# Patient Record
Sex: FEMALE | Race: BLACK OR AFRICAN AMERICAN | NOT HISPANIC OR LATINO | Employment: UNEMPLOYED | URBAN - METROPOLITAN AREA
[De-identification: names, ages, dates, MRNs, and addresses within clinical notes are randomized per-mention and may not be internally consistent; named-entity substitution may affect disease eponyms.]

---

## 2021-03-24 ENCOUNTER — HISTORICAL (OUTPATIENT)
Dept: RADIOLOGY | Facility: HOSPITAL | Age: 41
End: 2021-03-24

## 2021-03-24 LAB
ABS NEUT (OLG): 2.1 X10(3)/MCL (ref 1.5–6.9)
ALBUMIN SERPL-MCNC: 3.9 GM/DL (ref 3.5–5)
ALBUMIN/GLOB SERPL: 1 RATIO (ref 1.1–2)
ALP SERPL-CCNC: 72 UNIT/L (ref 40–150)
ALT SERPL-CCNC: 17 UNIT/L (ref 0–55)
AST SERPL-CCNC: 19 UNIT/L (ref 5–34)
BASOPHILS # BLD AUTO: 0 X10(3)/MCL (ref 0–0.1)
BASOPHILS NFR BLD AUTO: 0 % (ref 0–1)
BILIRUB SERPL-MCNC: 0.4 MG/DL
BILIRUBIN DIRECT+TOT PNL SERPL-MCNC: 0.1 MG/DL (ref 0–0.5)
BILIRUBIN DIRECT+TOT PNL SERPL-MCNC: 0.3 MG/DL (ref 0–0.8)
BUN SERPL-MCNC: 11 MG/DL (ref 7–18.7)
CALCIUM SERPL-MCNC: 9.9 MG/DL (ref 8.4–10.2)
CHLORIDE SERPL-SCNC: 104 MMOL/L (ref 98–107)
CHOLEST SERPL-MCNC: 235 MG/DL
CHOLEST/HDLC SERPL: 5 {RATIO} (ref 0–5)
CO2 SERPL-SCNC: 29 MMOL/L (ref 22–29)
CREAT SERPL-MCNC: 0.95 MG/DL (ref 0.55–1.02)
DEPRECATED CALCIDIOL+CALCIFEROL SERPL-MC: 8.8 NG/ML (ref 30–80)
EOSINOPHIL # BLD AUTO: 0.1 X10(3)/MCL (ref 0–0.6)
EOSINOPHIL NFR BLD AUTO: 2 % (ref 0–5)
ERYTHROCYTE [DISTWIDTH] IN BLOOD BY AUTOMATED COUNT: 13.1 % (ref 11.5–17)
EST. AVERAGE GLUCOSE BLD GHB EST-MCNC: 105.4 MG/DL
GLOBULIN SER-MCNC: 4 GM/DL (ref 2.4–3.5)
GLUCOSE SERPL-MCNC: 89 MG/DL (ref 74–100)
HBA1C MFR BLD: 5.3 %
HCT VFR BLD AUTO: 42.5 % (ref 36–48)
HDLC SERPL-MCNC: 43 MG/DL (ref 35–60)
HGB BLD-MCNC: 13.4 GM/DL (ref 12–16)
IMM GRANULOCYTES # BLD AUTO: 0.01 10*3/UL (ref 0–0.02)
IMM GRANULOCYTES NFR BLD AUTO: 0.2 % (ref 0–0.43)
LDLC SERPL CALC-MCNC: 165 MG/DL (ref 50–140)
LYMPHOCYTES # BLD AUTO: 2 X10(3)/MCL (ref 0.5–4.1)
LYMPHOCYTES NFR BLD AUTO: 44 % (ref 15–40)
MCH RBC QN AUTO: 28 PG (ref 27–34)
MCHC RBC AUTO-ENTMCNC: 32 GM/DL (ref 31–36)
MCV RBC AUTO: 90 FL (ref 80–99)
MONOCYTES # BLD AUTO: 0.4 X10(3)/MCL (ref 0–1.1)
MONOCYTES NFR BLD AUTO: 8 % (ref 4–12)
NEUTROPHILS # BLD AUTO: 2.1 X10(3)/MCL (ref 1.5–6.9)
NEUTROPHILS NFR BLD AUTO: 45 % (ref 43–75)
PLATELET # BLD AUTO: 281 X10(3)/MCL (ref 140–400)
PMV BLD AUTO: 10.3 FL (ref 6.8–10)
POTASSIUM SERPL-SCNC: 4.2 MMOL/L (ref 3.5–5.1)
PROT SERPL-MCNC: 7.9 GM/DL (ref 6.4–8.3)
RBC # BLD AUTO: 4.75 X10(6)/MCL (ref 4.2–5.4)
SODIUM SERPL-SCNC: 141 MMOL/L (ref 136–145)
TRIGL SERPL-MCNC: 137 MG/DL (ref 37–140)
TSH SERPL-ACNC: 0.73 UIU/ML (ref 0.35–4.94)
VLDLC SERPL CALC-MCNC: 27 MG/DL
WBC # SPEC AUTO: 4.6 X10(3)/MCL (ref 4.5–11.5)

## 2022-02-11 ENCOUNTER — HISTORICAL (OUTPATIENT)
Dept: ADMINISTRATIVE | Facility: HOSPITAL | Age: 42
End: 2022-02-11

## 2022-06-27 ENCOUNTER — CLINICAL SUPPORT (OUTPATIENT)
Dept: RESPIRATORY THERAPY | Facility: HOSPITAL | Age: 42
End: 2022-06-27
Attending: NURSE PRACTITIONER
Payer: MEDICAID

## 2022-06-27 DIAGNOSIS — F15.90 OTHER STIMULANT USE, UNSPECIFIED, UNCOMPLICATED: Primary | ICD-10-CM

## 2022-06-27 DIAGNOSIS — F15.90 OTHER STIMULANT USE, UNSPECIFIED, UNCOMPLICATED: ICD-10-CM

## 2022-06-27 PROCEDURE — 93005 ELECTROCARDIOGRAM TRACING: CPT

## 2022-07-07 ENCOUNTER — HOSPITAL ENCOUNTER (EMERGENCY)
Facility: HOSPITAL | Age: 42
Discharge: HOME | End: 2022-07-07
Attending: INTERNAL MEDICINE
Payer: MEDICAID

## 2022-07-07 VITALS
SYSTOLIC BLOOD PRESSURE: 137 MMHG | DIASTOLIC BLOOD PRESSURE: 77 MMHG | HEIGHT: 66 IN | WEIGHT: 250 LBS | BODY MASS INDEX: 40.18 KG/M2 | TEMPERATURE: 98 F | RESPIRATION RATE: 20 BRPM | HEART RATE: 79 BPM | OXYGEN SATURATION: 99 %

## 2022-07-07 DIAGNOSIS — M10.9 ACUTE GOUT OF LEFT FOOT, UNSPECIFIED CAUSE: Primary | ICD-10-CM

## 2022-07-07 PROCEDURE — 96372 THER/PROPH/DIAG INJ SC/IM: CPT | Performed by: NURSE PRACTITIONER

## 2022-07-07 PROCEDURE — 63600175 PHARM REV CODE 636 W HCPCS: Performed by: NURSE PRACTITIONER

## 2022-07-07 PROCEDURE — 99284 EMERGENCY DEPT VISIT MOD MDM: CPT

## 2022-07-07 RX ORDER — IPRATROPIUM BROMIDE AND ALBUTEROL SULFATE 2.5; .5 MG/3ML; MG/3ML
3 SOLUTION RESPIRATORY (INHALATION)
COMMUNITY
Start: 2022-02-11

## 2022-07-07 RX ORDER — INDOMETHACIN 50 MG/1
50 CAPSULE ORAL
Qty: 15 CAPSULE | Refills: 0 | OUTPATIENT
Start: 2022-07-07 | End: 2023-08-16

## 2022-07-07 RX ORDER — IBUPROFEN 800 MG/1
TABLET ORAL
COMMUNITY
Start: 2022-06-19

## 2022-07-07 RX ORDER — BUSPIRONE HYDROCHLORIDE 7.5 MG/1
TABLET ORAL
COMMUNITY
Start: 2022-05-11

## 2022-07-07 RX ORDER — KETOROLAC TROMETHAMINE 30 MG/ML
30 INJECTION, SOLUTION INTRAMUSCULAR; INTRAVENOUS ONCE
Status: COMPLETED | OUTPATIENT
Start: 2022-07-07 | End: 2022-07-07

## 2022-07-07 RX ORDER — TRAZODONE HYDROCHLORIDE 50 MG/1
TABLET ORAL
COMMUNITY
Start: 2022-06-27

## 2022-07-07 RX ORDER — SERTRALINE HYDROCHLORIDE 50 MG/1
TABLET, FILM COATED ORAL
COMMUNITY
Start: 2022-05-11

## 2022-07-07 RX ORDER — DEXAMETHASONE SODIUM PHOSPHATE 4 MG/ML
4 INJECTION, SOLUTION INTRA-ARTICULAR; INTRALESIONAL; INTRAMUSCULAR; INTRAVENOUS; SOFT TISSUE
Status: COMPLETED | OUTPATIENT
Start: 2022-07-07 | End: 2022-07-07

## 2022-07-07 RX ORDER — DEXTROAMPHETAMINE SACCHARATE, AMPHETAMINE ASPARTATE MONOHYDRATE, DEXTROAMPHETAMINE SULFATE AND AMPHETAMINE SULFATE 5; 5; 5; 5 MG/1; MG/1; MG/1; MG/1
20 CAPSULE, EXTENDED RELEASE ORAL
COMMUNITY
Start: 2022-02-11

## 2022-07-07 RX ORDER — CETIRIZINE HYDROCHLORIDE 10 MG/1
10 TABLET ORAL DAILY PRN
COMMUNITY
Start: 2022-03-14

## 2022-07-07 RX ORDER — CLONIDINE HYDROCHLORIDE 0.1 MG/1
0.1 TABLET ORAL
COMMUNITY
Start: 2022-02-11

## 2022-07-07 RX ADMIN — KETOROLAC TROMETHAMINE 30 MG: 30 INJECTION, SOLUTION INTRAMUSCULAR; INTRAVENOUS at 09:07

## 2022-07-07 RX ADMIN — DEXAMETHASONE SODIUM PHOSPHATE 4 MG: 4 INJECTION, SOLUTION INTRA-ARTICULAR; INTRALESIONAL; INTRAMUSCULAR; INTRAVENOUS; SOFT TISSUE at 09:07

## 2022-07-08 NOTE — ED PROVIDER NOTES
"     Source of History:  Patient    Chief complaint:  Foot Injury (Left foot pain across top. Started yesterday. 10/10 pain)      HPI:  Susie Mcdermott is a 42 y.o. female presenting with left foot pain x1 day.  She states this started after wearing some tight fitting shoes and got to the point where was unable to bear weight without pain.  She has been ambulating but a limping gait.  She reports pain was 10/10 but she did take some ibuprofen which relieved some of it prior to arrival.  Obviously weight-bearing makes the pain worse and ibuprofen did give some relief prior to arrival.    This is the extent to the patients complaints today here in the emergency department.    ROS: As per HPI and below:  General: No fever.  No chills.  Eyes: No visual changes.  ENT: No sore throat. No ear pain  Head: No headache.    Chest: No shortness of breath. No cough.  Cardiovascular: No chest pain.  Abdomen: No abdominal pain.  No nausea or vomiting.  Genito-Urinary: No abnormal urination.  Neurologic: No focal weakness.  No numbness.  MSK:  Left foot pain.  Integument: No rashes or lesions.  Psych: No confusion      Review of patient's allergies indicates:  No Known Allergies    PMH:  As per HPI and below:  No past medical history on file.  Past Surgical History:   Procedure Laterality Date    INTERNAL FIXATION OF HIP USING THORNTON PIN         Social History     Tobacco Use    Smoking status: Never Smoker    Smokeless tobacco: Never Used   Substance Use Topics    Alcohol use: Yes     Comment: occasional       Physical Exam:    BP (!) 145/92   Pulse 63   Temp 98.7 °F (37.1 °C) (Oral)   Resp 16   Ht 5' 6" (1.676 m)   Wt 113.4 kg (250 lb)   LMP 06/23/2022   SpO2 97%   BMI 40.35 kg/m²   Nursing note and vital signs reviewed.  Appearance: Afebrile. Not toxic appearing. No acute distress.  Head: Atraumatic  Eyes: No conjunctival injection. No scleral icterus  ENT: Normal phonation  Chest/ Respiratory: No respiratory " distress. No accessory muscle use.  Cardiovascular: Regular rate   Abdomen:  Not distended.    Musculoskeletal:  Limited range of motion due to pain with some erythema to the left dorsal foot and some mild swelling.  Very painful to touch.  Denies history of gout..  No deformities.  Neck supple.  No meningismus.  Skin: No rashes seen.  Good turgor.  No ecchymoses.  Neurologic: GCS 15. Ambulates with a steady gait.   Mental Status:  Alert and oriented x 3.  Appropriate, conversant    Labs that have been ordered have been independently reviewed and interpreted by myself.    I decided to obtain the patient's medical records.  Summary of Medical Records:  Nursing documentation    Initial Impression/ Differential Dx:  Gout, tendinitis, foot strain.    MDM:    42 y.o. female with nontraumatic left foot pain presents to the emergency department for evaluation.  She denies history of gout but does eat red meat and denies frequent alcohol use.  With the severe tenderness to the left foot as well as the erythema and swelling I feel like this is most likely gout however tendinitis is a possibility due to her wearing some tight fitting shoes and could possibly have strained some of the ligaments in her foot.  I discussed the treatment for both the same however that she would definitely need to follow-up with her primary care provider to have a uric acid level drawn and possibly be treated for high uric acid levels.  She verbalized understanding of this.  We discussed the eye medications tonight and anti-inflammatories and steroids to use for pain at home.                   Diagnostic Impression:    1. Acute gout of left foot, unspecified cause         ED Disposition Condition    Discharge Stable          ED Prescriptions     Medication Sig Dispense Start Date End Date Auth. Provider    indomethacin (INDOCIN) 50 MG capsule Take 1 capsule (50 mg total) by mouth 3 (three) times daily with meals. 15 capsule 7/7/2022  Jose LOONEY  JEM Michelle        Follow-up Information     Follow up With Specialties Details Why Contact Info    Kelley Dejesus NP [facility]   [address] [facility] [address]  [phone]             JEM Schumacher  07/07/22 0993

## 2023-03-15 ENCOUNTER — HOSPITAL ENCOUNTER (EMERGENCY)
Facility: HOSPITAL | Age: 43
Discharge: HOME | End: 2023-03-15
Attending: INTERNAL MEDICINE
Payer: MEDICAID

## 2023-03-15 VITALS
BODY MASS INDEX: 40.97 KG/M2 | WEIGHT: 240 LBS | RESPIRATION RATE: 18 BRPM | OXYGEN SATURATION: 98 % | HEIGHT: 64 IN | DIASTOLIC BLOOD PRESSURE: 86 MMHG | SYSTOLIC BLOOD PRESSURE: 153 MMHG | HEART RATE: 89 BPM | TEMPERATURE: 98 F

## 2023-03-15 DIAGNOSIS — S93.601A SPRAIN OF RIGHT FOOT, INITIAL ENCOUNTER: Primary | ICD-10-CM
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
## 2023-03-15 PROCEDURE — 99283 EMERGENCY DEPT VISIT LOW MDM: CPT

## 2023-03-15 RX ORDER — MELOXICAM 7.5 MG/1
7.5 TABLET ORAL DAILY
Qty: 30 TABLET | Refills: 0 | Status: SHIPPED | OUTPATIENT
Start: 2023-03-15 | End: 2023-04-14

## 2023-03-15 NOTE — ED PROVIDER NOTES
Encounter Date: 3/15/2023       History     Chief Complaint   Patient presents with    Foot Injury     R foot pain   non traumatic for severl weeks    treated for gout but not working     42-year-old female presents to the emergency department with complaints of right foot pain.  Patient was here with her mother who is being evaluated for possible UTI so she decided to get the foot checked out again.  Patient has been seen by urgent care but they did not have x-rays that they treated it empirically as gout with indomethacin and some IM injections.  She states he got much better after the IM injections however pain has returned and she states is not as severe now.  She denies any injury or trauma to this foot.  Denies history of gout but thought that it looked like gout at the time she went into the emergency room.  Denies any other associated symptoms or soon relieving factors otherwise.    Review of patient's allergies indicates:  No Known Allergies  No past medical history on file.  Past Surgical History:   Procedure Laterality Date    INTERNAL FIXATION OF HIP USING THORNTON PIN       No family history on file.  Social History     Tobacco Use    Smoking status: Never    Smokeless tobacco: Never   Substance Use Topics    Alcohol use: Yes     Comment: occasional     Review of Systems   Constitutional:  Negative for activity change, appetite change and fever.   HENT:  Negative for congestion, dental problem and sore throat.    Eyes:  Negative for discharge and itching.   Respiratory:  Negative for apnea, chest tightness and shortness of breath.    Cardiovascular:  Negative for chest pain.   Gastrointestinal:  Negative for abdominal distention, abdominal pain and nausea.   Endocrine: Negative for cold intolerance and heat intolerance.   Genitourinary:  Negative for dysuria, vaginal discharge and vaginal pain.   Musculoskeletal:  Positive for myalgias. Negative for back pain and joint swelling.   Skin:  Negative for  rash.   Neurological:  Negative for dizziness, facial asymmetry and weakness.   Hematological:  Does not bruise/bleed easily.   Psychiatric/Behavioral:  Negative for agitation and behavioral problems.    All other systems reviewed and are negative.    Physical Exam     Initial Vitals [03/15/23 1419]   BP Pulse Resp Temp SpO2   (!) 160/89 80 20 98 °F (36.7 °C) 98 %      MAP       --         Physical Exam    Nursing note and vitals reviewed.  Constitutional: Vital signs are normal. She appears well-developed and well-nourished.  Non-toxic appearance. She does not have a sickly appearance.   HENT:   Head: Normocephalic and atraumatic.   Right Ear: External ear normal.   Left Ear: External ear normal.   Eyes: Conjunctivae, EOM and lids are normal. Pupils are equal, round, and reactive to light. Lids are everted and swept, no foreign bodies found.   Neck: Trachea normal and phonation normal. Neck supple. No thyroid mass and no thyromegaly present.   Normal range of motion.   Full passive range of motion without pain.     Cardiovascular:  Normal rate, regular rhythm, S1 normal, S2 normal, normal heart sounds, intact distal pulses and normal pulses.           Pulmonary/Chest: Breath sounds normal. No respiratory distress.   Abdominal: Abdomen is soft.   Musculoskeletal:         General: Tenderness present. No edema. Normal range of motion.      Cervical back: Full passive range of motion without pain, normal range of motion and neck supple.      Comments: Patient tender with palpation to the right dorsal lateral foot.  Patient has some discomfort with valgus Veress flexion extension of the foot at the ankle but really not limited has pretty good range of motion.     Lymphadenopathy:     She has no cervical adenopathy.   Neurological: She is alert and oriented to person, place, and time. She has normal strength and normal reflexes.   Skin: Skin is warm, dry and intact. Capillary refill takes less than 2 seconds.    Psychiatric: She has a normal mood and affect. Her speech is normal and behavior is normal. Judgment normal. Cognition and memory are normal.       ED Course   Procedures  Labs Reviewed   HCG QUALITATIVE URINE          Imaging Results              X-Ray Foot Complete Right (Final result)  Result time 03/15/23 15:46:33      Final result by Saqib Cho MD (03/15/23 15:46:33)                   Impression:      1. No acute osseous defect identified  2. MR examination would allow further evaluation if clinically indicated      Electronically signed by: Saqib Cho  Date:    03/15/2023  Time:    15:46               Narrative:    EXAMINATION:  XR FOOT COMPLETE 3 VIEW RIGHT    CLINICAL HISTORY:  Unspecified fall, initial encounter; .    COMPARISON:  None available.    FINDINGS:  AP, lateral, and oblique views revealno definite fracture or dislocation.  There is mild flexion/extension of the toes.  No aggressive osteolytic or osteoblastic lesion is seen.  The lateral view is limited secondary to suboptimal positioning.                                       Medications - No data to display                    Medical Decision Making  42-year-old female presents to the emergency department complaints of nontraumatic right foot pain.  Patient states she has been seen evaluated for this and thinks it is likely gout but never had an x-ray so she is unsure.  Patient states pain is a times did not with a and worse with pushing on the dorsal lateral foot.    Problems Addressed:  Sprain of right foot, initial encounter: acute illness or injury     Details: Ace wrap here.  P.o. anti-inflammatory medications since the patient's pharmacy.    Amount and/or Complexity of Data Reviewed  External Data Reviewed: labs and notes.  Radiology: ordered. Decision-making details documented in ED Course.  Discussion of management or test interpretation with external provider(s): Will treat right foot pain with anti-inflammatories.  Ace wrap.   The discussed PCP follow-up for continued evaluation treatment of foot pain.  Patient where plan of care and had no other questions concerns.            Clinical Impression:   Final diagnoses:  [W19.XXXA] Fall  [S93.601A] Sprain of right foot, initial encounter (Primary)        ED Disposition Condition    Discharge Stable          ED Prescriptions       Medication Sig Dispense Start Date End Date Auth. Provider    meloxicam (MOBIC) 7.5 MG tablet Take 1 tablet (7.5 mg total) by mouth once daily. 30 tablet 3/15/2023 4/14/2023 JEM Schumacher          Follow-up Information    None          JEM Schumacher  03/15/23 4596

## 2023-03-23 DIAGNOSIS — Z12.31 ENCOUNTER FOR SCREENING MAMMOGRAM FOR MALIGNANT NEOPLASM OF BREAST: Primary | ICD-10-CM

## 2023-08-16 ENCOUNTER — HOSPITAL ENCOUNTER (EMERGENCY)
Facility: HOSPITAL | Age: 43
Discharge: HOME | End: 2023-08-16
Attending: INTERNAL MEDICINE
Payer: MEDICAID

## 2023-08-16 VITALS
RESPIRATION RATE: 16 BRPM | TEMPERATURE: 99 F | DIASTOLIC BLOOD PRESSURE: 82 MMHG | OXYGEN SATURATION: 99 % | HEART RATE: 68 BPM | HEIGHT: 64 IN | SYSTOLIC BLOOD PRESSURE: 132 MMHG | BODY MASS INDEX: 42.68 KG/M2 | WEIGHT: 250 LBS

## 2023-08-16 DIAGNOSIS — R52 PAIN: ICD-10-CM

## 2023-08-16 DIAGNOSIS — M79.671 RIGHT FOOT PAIN: Primary | ICD-10-CM

## 2023-08-16 DIAGNOSIS — E79.0 ELEVATED BLOOD URIC ACID LEVEL: ICD-10-CM

## 2023-08-16 LAB
ANION GAP SERPL CALC-SCNC: 10 MEQ/L
BASOPHILS # BLD AUTO: 0.02 X10(3)/MCL
BASOPHILS NFR BLD AUTO: 0.3 %
BUN SERPL-MCNC: 9 MG/DL (ref 7–18.7)
CALCIUM SERPL-MCNC: 9 MG/DL (ref 8.4–10.2)
CHLORIDE SERPL-SCNC: 105 MMOL/L (ref 98–107)
CO2 SERPL-SCNC: 24 MMOL/L (ref 22–29)
CREAT SERPL-MCNC: 0.91 MG/DL (ref 0.55–1.02)
CREAT/UREA NIT SERPL: 10
EOSINOPHIL # BLD AUTO: 0.16 X10(3)/MCL (ref 0–0.9)
EOSINOPHIL NFR BLD AUTO: 2.4 %
ERYTHROCYTE [DISTWIDTH] IN BLOOD BY AUTOMATED COUNT: 13.5 % (ref 11.5–17)
ERYTHROCYTE [SEDIMENTATION RATE] IN BLOOD: 31 MM/HR (ref 0–20)
GFR SERPLBLD CREATININE-BSD FMLA CKD-EPI: >60 MLS/MIN/1.73/M2
GLUCOSE SERPL-MCNC: 114 MG/DL (ref 74–100)
HCT VFR BLD AUTO: 38.6 % (ref 37–47)
HGB BLD-MCNC: 12.3 G/DL (ref 12–16)
IMM GRANULOCYTES # BLD AUTO: 0.02 X10(3)/MCL (ref 0–0.04)
IMM GRANULOCYTES NFR BLD AUTO: 0.3 %
LYMPHOCYTES # BLD AUTO: 2.47 X10(3)/MCL (ref 0.6–4.6)
LYMPHOCYTES NFR BLD AUTO: 36.9 %
MCH RBC QN AUTO: 28.4 PG (ref 27–31)
MCHC RBC AUTO-ENTMCNC: 31.9 G/DL (ref 33–36)
MCV RBC AUTO: 89.1 FL (ref 80–94)
MONOCYTES # BLD AUTO: 0.41 X10(3)/MCL (ref 0.1–1.3)
MONOCYTES NFR BLD AUTO: 6.1 %
NEUTROPHILS # BLD AUTO: 3.62 X10(3)/MCL (ref 2.1–9.2)
NEUTROPHILS NFR BLD AUTO: 54 %
PLATELET # BLD AUTO: 236 X10(3)/MCL (ref 130–400)
PMV BLD AUTO: 10.6 FL (ref 7.4–10.4)
POTASSIUM SERPL-SCNC: 3.8 MMOL/L (ref 3.5–5.1)
RBC # BLD AUTO: 4.33 X10(6)/MCL (ref 4.2–5.4)
SODIUM SERPL-SCNC: 139 MMOL/L (ref 136–145)
URATE SERPL-MCNC: 8.9 MG/DL (ref 2.6–6)
WBC # SPEC AUTO: 6.7 X10(3)/MCL (ref 4.5–11.5)

## 2023-08-16 PROCEDURE — 85652 RBC SED RATE AUTOMATED: CPT | Performed by: NURSE PRACTITIONER

## 2023-08-16 PROCEDURE — 99284 EMERGENCY DEPT VISIT MOD MDM: CPT

## 2023-08-16 PROCEDURE — 80048 BASIC METABOLIC PNL TOTAL CA: CPT | Performed by: NURSE PRACTITIONER

## 2023-08-16 PROCEDURE — 96372 THER/PROPH/DIAG INJ SC/IM: CPT | Performed by: NURSE PRACTITIONER

## 2023-08-16 PROCEDURE — 63600175 PHARM REV CODE 636 W HCPCS: Performed by: NURSE PRACTITIONER

## 2023-08-16 PROCEDURE — 85025 COMPLETE CBC W/AUTO DIFF WBC: CPT | Performed by: NURSE PRACTITIONER

## 2023-08-16 PROCEDURE — 84550 ASSAY OF BLOOD/URIC ACID: CPT | Performed by: NURSE PRACTITIONER

## 2023-08-16 RX ORDER — PREDNISONE 10 MG/1
10 TABLET ORAL DAILY
Qty: 21 TABLET | Refills: 0 | Status: SHIPPED | OUTPATIENT
Start: 2023-08-16

## 2023-08-16 RX ORDER — DEXAMETHASONE SODIUM PHOSPHATE 4 MG/ML
INJECTION, SOLUTION INTRA-ARTICULAR; INTRALESIONAL; INTRAMUSCULAR; INTRAVENOUS; SOFT TISSUE
Status: DISPENSED
Start: 2023-08-16 | End: 2023-08-17

## 2023-08-16 RX ORDER — DEXAMETHASONE SODIUM PHOSPHATE 4 MG/ML
4 INJECTION, SOLUTION INTRA-ARTICULAR; INTRALESIONAL; INTRAMUSCULAR; INTRAVENOUS; SOFT TISSUE
Status: COMPLETED | OUTPATIENT
Start: 2023-08-16 | End: 2023-08-16

## 2023-08-16 RX ORDER — KETOROLAC TROMETHAMINE 30 MG/ML
60 INJECTION, SOLUTION INTRAMUSCULAR; INTRAVENOUS
Status: COMPLETED | OUTPATIENT
Start: 2023-08-16 | End: 2023-08-16

## 2023-08-16 RX ORDER — KETOROLAC TROMETHAMINE 30 MG/ML
INJECTION, SOLUTION INTRAMUSCULAR; INTRAVENOUS
Status: DISPENSED
Start: 2023-08-16 | End: 2023-08-17

## 2023-08-16 RX ORDER — INDOMETHACIN 50 MG/1
50 CAPSULE ORAL
Qty: 15 CAPSULE | Refills: 0 | Status: SHIPPED | OUTPATIENT
Start: 2023-08-16

## 2023-08-16 RX ADMIN — KETOROLAC TROMETHAMINE 60 MG: 60 INJECTION, SOLUTION INTRAMUSCULAR at 07:08

## 2023-08-16 RX ADMIN — DEXAMETHASONE SODIUM PHOSPHATE 4 MG: 4 INJECTION, SOLUTION INTRA-ARTICULAR; INTRALESIONAL; INTRAMUSCULAR; INTRAVENOUS; SOFT TISSUE at 07:08

## 2023-08-17 NOTE — ED PROVIDER NOTES
Encounter Date: 8/16/2023       History     Chief Complaint   Patient presents with    Ankle Pain     R ankle pain and swelling   started yesterday   denies injury or strain   hx similar episodes     Patient is a 43-year-old female presents emerged department complaints of right foot pain and swelling started yesterday.  She denies any injury or trauma to foot.  She states that it was hurting bad and she took her shoe off work and began hurting worse.  She states she is had this in the past and was told it was gout but her PCP does not believe his gout in his referring her to a neurologist but she is not get an appointment yet.  She denies any other complaints or associated symptoms.      Review of patient's allergies indicates:  No Known Allergies  No past medical history on file.  Past Surgical History:   Procedure Laterality Date    INTERNAL FIXATION OF HIP USING THORNTON PIN       No family history on file.  Social History     Tobacco Use    Smoking status: Never    Smokeless tobacco: Never   Substance Use Topics    Alcohol use: Yes     Comment: occasional     Review of Systems   Constitutional:  Negative for activity change, appetite change and fever.   HENT:  Negative for congestion, dental problem and sore throat.    Eyes:  Negative for discharge and itching.   Respiratory:  Negative for apnea, chest tightness and shortness of breath.    Cardiovascular:  Negative for chest pain.   Gastrointestinal:  Negative for nausea.   Endocrine: Negative for cold intolerance and heat intolerance.   Genitourinary:  Negative for dysuria, vaginal bleeding, vaginal discharge and vaginal pain.   Musculoskeletal:  Positive for gait problem, joint swelling and myalgias. Negative for back pain.   Skin:  Negative for rash.   Neurological:  Negative for dizziness, facial asymmetry and weakness.   Hematological:  Does not bruise/bleed easily.   Psychiatric/Behavioral:  Negative for agitation and behavioral problems.    All other  systems reviewed and are negative.      Physical Exam     Initial Vitals   BP Pulse Resp Temp SpO2   08/16/23 1755 08/16/23 1753 08/16/23 1753 08/16/23 1753 08/16/23 1753   132/82 68 16 98.6 °F (37 °C) 99 %      MAP       --                Physical Exam    Nursing note and vitals reviewed.  Constitutional: Vital signs are normal. She appears well-developed and well-nourished.  Non-toxic appearance. She does not have a sickly appearance.   HENT:   Head: Normocephalic and atraumatic.   Right Ear: External ear normal.   Left Ear: External ear normal.   Eyes: Conjunctivae, EOM and lids are normal. Pupils are equal, round, and reactive to light. Lids are everted and swept, no foreign bodies found.   Neck: Trachea normal and phonation normal. Neck supple. No thyroid mass and no thyromegaly present.   Normal range of motion.   Full passive range of motion without pain.     Cardiovascular:  Normal rate, regular rhythm, S1 normal, S2 normal, normal heart sounds, intact distal pulses and normal pulses.           Pulmonary/Chest: Breath sounds normal. No respiratory distress.   Abdominal: Abdomen is soft. There is no abdominal tenderness.   Musculoskeletal:         General: Tenderness present.      Cervical back: Full passive range of motion without pain, normal range of motion and neck supple.      Comments: Tenderness to the right lateral dorsal foot.  No major redness swelling or edema.     Lymphadenopathy:     She has no cervical adenopathy.   Neurological: She is alert and oriented to person, place, and time. She has normal strength. GCS score is 15. GCS eye subscore is 4. GCS verbal subscore is 5. GCS motor subscore is 6.   Skin: Skin is warm, dry and intact. Capillary refill takes less than 2 seconds.   Psychiatric: She has a normal mood and affect. Her speech is normal and behavior is normal. Judgment normal. Cognition and memory are normal.         ED Course   Procedures  Labs Reviewed   SEDIMENTATION RATE, AUTOMATED  - Abnormal; Notable for the following components:       Result Value    Sed Rate 31 (*)     All other components within normal limits   URIC ACID - Abnormal; Notable for the following components:    Uric Acid 8.9 (*)     All other components within normal limits   BASIC METABOLIC PANEL - Abnormal; Notable for the following components:    Glucose Level 114 (*)     All other components within normal limits   CBC WITH DIFFERENTIAL - Abnormal; Notable for the following components:    MCHC 31.9 (*)     MPV 10.6 (*)     All other components within normal limits   CBC W/ AUTO DIFFERENTIAL    Narrative:     The following orders were created for panel order CBC auto differential.  Procedure                               Abnormality         Status                     ---------                               -----------         ------                     CBC with Differential[[id number]]        Abnormal            Final result                 Please view results for these tests on the individual orders.          Imaging Results              X-Ray Foot Complete Right (Preliminary result)  Result time 08/16/23 19:31:03      Wet Read by Jose Michelle FNP (08/16/23 19:31:03, [facility] - Emergency Dept, Emergency Medicine)    Wet read:  No acute bony abnormality seen on plain film x-ray of the foot.  Pending radiologist's review.                                     Medications   ketorolac injection 60 mg (60 mg Intramuscular Given 8/16/23 1920)   dexAMETHasone injection 4 mg (4 mg Intramuscular Given 8/16/23 1920)                       Medical Decision Making  Patient is a 43-year-old female presents emerged department complaints of nontraumatic left dorsal foot pain since yesterday.  She denies any injury trauma to the foot.  She states pain is like a tightness and pressure she denies any worsening alleviating factors.    Problems Addressed:  Elevated blood uric acid level: acute illness or injury     Details:  Understand the uric acid level being high during pain does not me she is having hair gout flare-up but but the rest of her workup is negative for any acute abnormalities.  Did discuss IM anti-inflammatories and steroid.  Discussed anti-inflammatories continue at home.  Discussed PCP follow-up for uric acid levels being elevated and possible medication for this.  Strict ER return precautions discussed for any changing worsening symptoms.  Right foot pain: acute illness or injury    Amount and/or Complexity of Data Reviewed  External Data Reviewed: labs, radiology and notes.  Labs: ordered. Decision-making details documented in ED Course.  Radiology: ordered.  ECG/medicine tests: independent interpretation performed.    Risk  Prescription drug management.            Clinical Impression:   Final diagnoses:  [R52] Pain  [M79.671] Right foot pain (Primary)  [E79.0] Elevated blood uric acid level        ED Disposition Condition    Discharge Stable          ED Prescriptions       Medication Sig Dispense Start Date End Date Auth. Provider    predniSONE (DELTASONE) 10 MG tablet Take 1 tablet (10 mg total) by mouth once daily. Take 4 tabs x 3 days, then take 2 tabs x 3 days, then take 1 tab x 3 days. 21 tablet 8/16/2023 -- Jose Michelle FNP    indomethacin (INDOCIN) 50 MG capsule Take 1 capsule (50 mg total) by mouth 3 (three) times daily with meals. 15 capsule 8/16/2023 -- Jose Michelle FNP          Follow-up Information       Follow up With Specialties Details Why Contact Kelley Church NP Family Medicine Schedule an appointment as soon as possible for a visit  As needed, For ER Follow Up. [address] [facility] [address]  [phone]               Jose Michelle FNP  08/16/23 1930

## 2024-05-25 ENCOUNTER — HOSPITAL ENCOUNTER (EMERGENCY)
Facility: HOSPITAL | Age: 44
Discharge: HOME | End: 2024-05-25
Attending: INTERNAL MEDICINE
Payer: MEDICAID

## 2024-05-25 VITALS
HEART RATE: 67 BPM | HEIGHT: 66 IN | OXYGEN SATURATION: 100 % | TEMPERATURE: 98 F | WEIGHT: 260 LBS | BODY MASS INDEX: 41.78 KG/M2 | SYSTOLIC BLOOD PRESSURE: 158 MMHG | RESPIRATION RATE: 20 BRPM | DIASTOLIC BLOOD PRESSURE: 97 MMHG

## 2024-05-25 DIAGNOSIS — M25.572 ARTHRALGIA OF LEFT ANKLE OR FOOT: Primary | ICD-10-CM

## 2024-05-25 DIAGNOSIS — Z87.39 HISTORY OF GOUT: ICD-10-CM

## 2024-05-25 PROCEDURE — 63600175 PHARM REV CODE 636 W HCPCS: Performed by: INTERNAL MEDICINE

## 2024-05-25 PROCEDURE — 25000003 PHARM REV CODE 250: Performed by: INTERNAL MEDICINE

## 2024-05-25 PROCEDURE — 99284 EMERGENCY DEPT VISIT MOD MDM: CPT | Mod: 25

## 2024-05-25 PROCEDURE — 96372 THER/PROPH/DIAG INJ SC/IM: CPT | Performed by: INTERNAL MEDICINE

## 2024-05-25 RX ORDER — ONDANSETRON 4 MG/1
4 TABLET, ORALLY DISINTEGRATING ORAL ONCE
Status: COMPLETED | OUTPATIENT
Start: 2024-05-25 | End: 2024-05-25

## 2024-05-25 RX ORDER — DEXAMETHASONE SODIUM PHOSPHATE 4 MG/ML
8 INJECTION, SOLUTION INTRA-ARTICULAR; INTRALESIONAL; INTRAMUSCULAR; INTRAVENOUS; SOFT TISSUE
Status: COMPLETED | OUTPATIENT
Start: 2024-05-25 | End: 2024-05-25

## 2024-05-25 RX ORDER — HYDROCODONE BITARTRATE AND ACETAMINOPHEN 10; 325 MG/1; MG/1
1 TABLET ORAL ONCE
Status: COMPLETED | OUTPATIENT
Start: 2024-05-25 | End: 2024-05-25

## 2024-05-25 RX ORDER — KETOROLAC TROMETHAMINE 30 MG/ML
60 INJECTION, SOLUTION INTRAMUSCULAR; INTRAVENOUS
Status: COMPLETED | OUTPATIENT
Start: 2024-05-25 | End: 2024-05-25

## 2024-05-25 RX ORDER — PREDNISONE 20 MG/1
20 TABLET ORAL DAILY
Qty: 5 TABLET | Refills: 0 | Status: SHIPPED | OUTPATIENT
Start: 2024-05-25 | End: 2024-05-30

## 2024-05-25 RX ORDER — INDOMETHACIN 25 MG/1
25 CAPSULE ORAL
Qty: 20 CAPSULE | Refills: 0 | Status: SHIPPED | OUTPATIENT
Start: 2024-05-25

## 2024-05-25 RX ORDER — HYDROCODONE BITARTRATE AND ACETAMINOPHEN 7.5; 325 MG/1; MG/1
1 TABLET ORAL EVERY 6 HOURS PRN
Qty: 10 TABLET | Refills: 0 | Status: SHIPPED | OUTPATIENT
Start: 2024-05-25

## 2024-05-25 RX ADMIN — ONDANSETRON 4 MG: 4 TABLET, ORALLY DISINTEGRATING ORAL at 08:05

## 2024-05-25 RX ADMIN — KETOROLAC TROMETHAMINE 60 MG: 30 INJECTION, SOLUTION INTRAMUSCULAR; INTRAVENOUS at 08:05

## 2024-05-25 RX ADMIN — DEXAMETHASONE SODIUM PHOSPHATE 8 MG: 4 INJECTION, SOLUTION INTRA-ARTICULAR; INTRALESIONAL; INTRAMUSCULAR; INTRAVENOUS; SOFT TISSUE at 08:05

## 2024-05-25 RX ADMIN — HYDROCODONE BITARTRATE AND ACETAMINOPHEN 1 TABLET: 10; 325 TABLET ORAL at 08:05

## 2024-05-25 NOTE — ED PROVIDER NOTES
Source of History:  Patient, no limitations    Chief complaint:  Foot Pain (Pt c/o pain to left foot onset yesterday, denies injury.)      HPI:  Susie Mcdermott is a 44 y.o. female presenting with Foot Pain (Pt c/o pain to left foot onset yesterday, denies injury.)         The patient complains of pain in the left ankle, without injury. Onset of symptoms was yesterday. Patient describes pain as aching and throbbing. Pain severity now is severe. Pain is aggravated by movement and weight bearing. Pain is alleviated by elevation. Symptoms associated with pain include extreme pain with micromovements. The patient denies other injuries.  Care prior to arrival consisted of nothing        Review of Systems   Constitutional symptoms:  Negative except as documented in HPI.   Skin symptoms:  Negative except as documented in HPI.   HEENT symptoms:  Negative except as documented in HPI.   Respiratory symptoms:  Negative except as documented in HPI.   Cardiovascular symptoms:  Negative except as documented in HPI.   Gastrointestinal symptoms:  Negative except as documented in HPI.    Genitourinary symptoms:  Negative except as documented in HPI.   Musculoskeletal symptoms:  Negative except as documented in HPI.   Neurologic symptoms:  Negative except as documented in HPI.   Psychiatric symptoms:  Negative except as documented in HPI.   Allergy/immunologic symptoms:  Negative except as documented in HPI.             Additional review of systems information: All other systems reviewed and otherwise negative.      Review of patient's allergies indicates:  No Known Allergies    PMH:  As per HPI and below:    History reviewed. No pertinent past medical history.    History reviewed. No pertinent family history.    Past Surgical History:   Procedure Laterality Date    INTERNAL FIXATION OF HIP USING THORNTON PIN         Social History     Tobacco Use    Smoking status: Never    Smokeless tobacco: Never   Substance Use Topics     "Alcohol use: Yes     Comment: occasional       There is no problem list on file for this patient.       Physical Exam:    BP (!) 163/97 (BP Location: Left forearm, Patient Position: Sitting)   Pulse 63   Temp 97.9 °F (36.6 °C) (Temporal)   Resp (!) 22   Ht 5' 6" (1.676 m)   Wt 117.9 kg (260 lb)   LMP 04/25/2024 (Approximate)   SpO2 100%   BMI 41.97 kg/m²     Nursing note and vital signs reviewed.    General:  Alert, no acute distress. obese  Skin: Normal for Ethnic Origin, No cyanosis  HEENT: Normocephalic and atraumatic, Vision unchanged, Pupils symmetric, No icterus , Nasal mucosa is pink and moist  Cardiovascular:  Regular rate and rhythm, No edema  Chest Wall: No deformity, equal chest rise  Respiratory:  Lungs are clear to auscultation, respirations are non-labored.    Musculoskeletal:  No deformity, Normal perfusion to all extremities, left ankle mild swelling with exquisite tenderness with micro movements  Gastrointestinal:  Soft, Non distended  Neurological:  Alert and oriented, normal motor observed, normal speech observed.    Psychiatric:  Cooperative, appropriate mood & affect.        Labs that have been ordered have been independently reviewed and interpreted by myself.     Old Chart Reviewed.      Initial Impression/ Differential Dx:  Foot contusion, foot or ankle sprain, foot or ankle bone fracture, DVT, effusion, cellulitis, osteomyelitis, neuropathy, tendonitis, plantar fasciitis, metatarsalgia, referred pain, arterial insufficiency       MDM:      Reviewed Nurses Note.    Reviewed Pertinent old records.    Orders Placed This Encounter    HYDROcodone-acetaminophen  mg per tablet 1 tablet    ondansetron disintegrating tablet 4 mg    dexAMETHasone injection 8 mg    ketorolac injection 60 mg    indomethacin (INDOCIN) 25 MG capsule    predniSONE (DELTASONE) 20 MG tablet    HYDROcodone-acetaminophen (NORCO) 7.5-325 mg per tablet                    Labs Reviewed - No data to display       No " orders to display        No visits with results within 1 Day(s) from this visit.   Latest known visit with results is:   Admission on 08/16/2023, Discharged on 08/16/2023   Component Date Value Ref Range Status    Sed Rate 08/16/2023 31 (H)  0 - 20 mm/hr Final    Uric Acid 08/16/2023 8.9 (H)  2.6 - 6.0 mg/dL Final    Sodium 08/16/2023 139  136 - 145 mmol/L Final    Potassium 08/16/2023 3.8  3.5 - 5.1 mmol/L Final    Chloride 08/16/2023 105  98 - 107 mmol/L Final    CO2 08/16/2023 24  22 - 29 mmol/L Final    Glucose 08/16/2023 114 (H)  74 - 100 mg/dL Final    Blood Urea Nitrogen 08/16/2023 9.0  7.0 - 18.7 mg/dL Final    Creatinine 08/16/2023 0.91  0.55 - 1.02 mg/dL Final    BUN/Creatinine Ratio 08/16/2023 10   Final    Calcium 08/16/2023 9.0  8.4 - 10.2 mg/dL Final    Anion Gap 08/16/2023 10.0  mEq/L Final    eGFR 08/16/2023 >60  mls/min/1.73/m2 Final    WBC 08/16/2023 6.70  4.50 - 11.50 x10(3)/mcL Final    RBC 08/16/2023 4.33  4.20 - 5.40 x10(6)/mcL Final    Hgb 08/16/2023 12.3  12.0 - 16.0 g/dL Final    Hct 08/16/2023 38.6  37.0 - 47.0 % Final    MCV 08/16/2023 89.1  80.0 - 94.0 fL Final    MCH 08/16/2023 28.4  27.0 - 31.0 pg Final    MCHC 08/16/2023 31.9 (L)  33.0 - 36.0 g/dL Final    RDW 08/16/2023 13.5  11.5 - 17.0 % Final    Platelet 08/16/2023 236  130 - 400 x10(3)/mcL Final    MPV 08/16/2023 10.6 (H)  7.4 - 10.4 fL Final    Neut % 08/16/2023 54.0  % Final    Lymph % 08/16/2023 36.9  % Final    Mono % 08/16/2023 6.1  % Final    Eos % 08/16/2023 2.4  % Final    Basophil % 08/16/2023 0.3  % Final    Lymph # 08/16/2023 2.47  0.6 - 4.6 x10(3)/mcL Final    Neut # 08/16/2023 3.62  2.1 - 9.2 x10(3)/mcL Final    Mono # 08/16/2023 0.41  0.1 - 1.3 x10(3)/mcL Final    Eos # 08/16/2023 0.16  0 - 0.9 x10(3)/mcL Final    Baso # 08/16/2023 0.02  <=0.2 x10(3)/mcL Final    IG# 08/16/2023 0.02  0 - 0.04 x10(3)/mcL Final    IG% 08/16/2023 0.3  % Final       Imaging Results    None                                               Diagnostic Impression:    1. Arthralgia of left ankle or foot    2. History of gout         ED Disposition Condition    Discharge Stable             Follow-up Information       [facility] - Emergency Dept.    Specialty: Emergency Medicine  Why: If symptoms worsen  Contact information:  [address]  [facility] [address]  [phone]                            ED Prescriptions       Medication Sig Dispense Start Date End Date Auth. Provider    indomethacin (INDOCIN) 25 MG capsule Take 1 capsule (25 mg total) by mouth every meal as needed (pain). 20 capsule 5/25/2024 -- Elio Maldonado DO    predniSONE (DELTASONE) 20 MG tablet Take 1 tablet (20 mg total) by mouth once daily. for 5 days 5 tablet 5/25/2024 5/30/2024 Elio Maldonado DO    HYDROcodone-acetaminophen (NORCO) 7.5-325 mg per tablet Take 1 tablet by mouth every 6 (six) hours as needed for Pain. 10 tablet 5/25/2024 -- Elio Maldonado DO          Follow-up Information       Follow up With Specialties Details Why Contact Info    [facility] - Emergency Dept Emergency Medicine  If symptoms worsen [address]  [facility] [id number] [phone]             Elio Maldonado DO  05/25/24 0889

## 2024-09-27 ENCOUNTER — HOSPITAL ENCOUNTER (EMERGENCY)
Facility: HOSPITAL | Age: 44
Discharge: HOME | End: 2024-09-27
Attending: EMERGENCY MEDICINE
Payer: MEDICAID

## 2024-09-27 VITALS
SYSTOLIC BLOOD PRESSURE: 148 MMHG | OXYGEN SATURATION: 98 % | HEIGHT: 66 IN | DIASTOLIC BLOOD PRESSURE: 79 MMHG | TEMPERATURE: 98 F | WEIGHT: 240 LBS | RESPIRATION RATE: 16 BRPM | HEART RATE: 61 BPM | BODY MASS INDEX: 38.57 KG/M2

## 2024-09-27 DIAGNOSIS — K57.32 DIVERTICULITIS OF SIGMOID COLON: Primary | ICD-10-CM

## 2024-09-27 LAB
ALBUMIN SERPL-MCNC: 3.6 G/DL (ref 3.5–5)
ALBUMIN/GLOB SERPL: 0.9 RATIO (ref 1.1–2)
ALP SERPL-CCNC: 61 UNIT/L (ref 40–150)
ALT SERPL-CCNC: 20 UNIT/L (ref 0–55)
ANION GAP SERPL CALC-SCNC: 9 MEQ/L
AST SERPL-CCNC: 20 UNIT/L (ref 5–34)
B-HCG UR QL: NEGATIVE
BACTERIA #/AREA URNS AUTO: ABNORMAL /HPF
BASOPHILS # BLD AUTO: 0.02 X10(3)/MCL
BASOPHILS NFR BLD AUTO: 0.3 %
BILIRUB SERPL-MCNC: 0.4 MG/DL
BILIRUB UR QL STRIP.AUTO: NEGATIVE
BUN SERPL-MCNC: 11 MG/DL (ref 7–18.7)
CALCIUM SERPL-MCNC: 8.8 MG/DL (ref 8.4–10.2)
CHLORIDE SERPL-SCNC: 105 MMOL/L (ref 98–107)
CLARITY UR: CLEAR
CO2 SERPL-SCNC: 27 MMOL/L (ref 22–29)
COLOR UR AUTO: YELLOW
CREAT SERPL-MCNC: 1.27 MG/DL (ref 0.55–1.02)
CREAT/UREA NIT SERPL: 9
EOSINOPHIL # BLD AUTO: 0.1 X10(3)/MCL (ref 0–0.9)
EOSINOPHIL NFR BLD AUTO: 1.7 %
ERYTHROCYTE [DISTWIDTH] IN BLOOD BY AUTOMATED COUNT: 12.9 % (ref 11.5–17)
GFR SERPLBLD CREATININE-BSD FMLA CKD-EPI: 54 ML/MIN/1.73/M2
GLOBULIN SER-MCNC: 3.8 GM/DL (ref 2.4–3.5)
GLUCOSE SERPL-MCNC: 163 MG/DL (ref 74–100)
GLUCOSE UR QL STRIP: NEGATIVE
HCT VFR BLD AUTO: 41.2 % (ref 37–47)
HGB BLD-MCNC: 13.3 G/DL (ref 12–16)
HGB UR QL STRIP: ABNORMAL
IMM GRANULOCYTES # BLD AUTO: 0.02 X10(3)/MCL (ref 0–0.04)
IMM GRANULOCYTES NFR BLD AUTO: 0.3 %
KETONES UR QL STRIP: NEGATIVE
LEUKOCYTE ESTERASE UR QL STRIP: ABNORMAL
LIPASE SERPL-CCNC: 150 U/L
LYMPHOCYTES # BLD AUTO: 1.92 X10(3)/MCL (ref 0.6–4.6)
LYMPHOCYTES NFR BLD AUTO: 32.2 %
MCH RBC QN AUTO: 28.6 PG (ref 27–31)
MCHC RBC AUTO-ENTMCNC: 32.3 G/DL (ref 33–36)
MCV RBC AUTO: 88.6 FL (ref 80–94)
MONOCYTES # BLD AUTO: 0.27 X10(3)/MCL (ref 0.1–1.3)
MONOCYTES NFR BLD AUTO: 4.5 %
MUCOUS THREADS URNS QL MICRO: ABNORMAL /LPF
NEUTROPHILS # BLD AUTO: 3.63 X10(3)/MCL (ref 2.1–9.2)
NEUTROPHILS NFR BLD AUTO: 61 %
NITRITE UR QL STRIP: NEGATIVE
PH UR STRIP: 6 [PH]
PLATELET # BLD AUTO: 245 X10(3)/MCL (ref 130–400)
PMV BLD AUTO: 10.5 FL (ref 7.4–10.4)
POTASSIUM SERPL-SCNC: 3.8 MMOL/L (ref 3.5–5.1)
PROT SERPL-MCNC: 7.4 GM/DL (ref 6.4–8.3)
PROT UR QL STRIP: NEGATIVE
RBC # BLD AUTO: 4.65 X10(6)/MCL (ref 4.2–5.4)
RBC #/AREA URNS AUTO: ABNORMAL /HPF
SODIUM SERPL-SCNC: 141 MMOL/L (ref 136–145)
SP GR UR STRIP.AUTO: 1.02 (ref 1–1.03)
SQUAMOUS #/AREA URNS AUTO: ABNORMAL /HPF
UROBILINOGEN UR STRIP-ACNC: 0.2
WBC # BLD AUTO: 5.96 X10(3)/MCL (ref 4.5–11.5)
WBC #/AREA URNS AUTO: ABNORMAL /HPF

## 2024-09-27 PROCEDURE — 96360 HYDRATION IV INFUSION INIT: CPT | Mod: 59

## 2024-09-27 PROCEDURE — 80053 COMPREHEN METABOLIC PANEL: CPT | Performed by: NURSE PRACTITIONER

## 2024-09-27 PROCEDURE — 83690 ASSAY OF LIPASE: CPT | Performed by: NURSE PRACTITIONER

## 2024-09-27 PROCEDURE — 25500020 PHARM REV CODE 255: Performed by: NURSE PRACTITIONER

## 2024-09-27 PROCEDURE — 81003 URINALYSIS AUTO W/O SCOPE: CPT | Performed by: NURSE PRACTITIONER

## 2024-09-27 PROCEDURE — 85025 COMPLETE CBC W/AUTO DIFF WBC: CPT | Performed by: NURSE PRACTITIONER

## 2024-09-27 PROCEDURE — 99285 EMERGENCY DEPT VISIT HI MDM: CPT | Mod: 25

## 2024-09-27 PROCEDURE — 25000003 PHARM REV CODE 250: Performed by: NURSE PRACTITIONER

## 2024-09-27 PROCEDURE — 81025 URINE PREGNANCY TEST: CPT | Performed by: NURSE PRACTITIONER

## 2024-09-27 RX ORDER — SODIUM CHLORIDE 9 MG/ML
1000 INJECTION, SOLUTION INTRAVENOUS
Status: COMPLETED | OUTPATIENT
Start: 2024-09-27 | End: 2024-09-27

## 2024-09-27 RX ORDER — CIPROFLOXACIN 500 MG/1
500 TABLET ORAL
Status: COMPLETED | OUTPATIENT
Start: 2024-09-27 | End: 2024-09-27

## 2024-09-27 RX ORDER — CIPROFLOXACIN 500 MG/1
500 TABLET ORAL 2 TIMES DAILY
Qty: 14 TABLET | Refills: 0 | Status: SHIPPED | OUTPATIENT
Start: 2024-09-27 | End: 2024-10-04

## 2024-09-27 RX ORDER — DICYCLOMINE HYDROCHLORIDE 10 MG/1
20 CAPSULE ORAL
Status: COMPLETED | OUTPATIENT
Start: 2024-09-27 | End: 2024-09-27

## 2024-09-27 RX ORDER — METRONIDAZOLE 500 MG/1
500 TABLET ORAL 3 TIMES DAILY
Qty: 21 TABLET | Refills: 0 | Status: SHIPPED | OUTPATIENT
Start: 2024-09-27 | End: 2024-10-04

## 2024-09-27 RX ORDER — METRONIDAZOLE 250 MG/1
500 TABLET ORAL
Status: COMPLETED | OUTPATIENT
Start: 2024-09-27 | End: 2024-09-27

## 2024-09-27 RX ORDER — DICYCLOMINE HYDROCHLORIDE 10 MG/ML
20 INJECTION INTRAMUSCULAR
Status: DISCONTINUED | OUTPATIENT
Start: 2024-09-27 | End: 2024-09-27

## 2024-09-27 RX ORDER — DICYCLOMINE HYDROCHLORIDE 20 MG/1
20 TABLET ORAL 3 TIMES DAILY PRN
Qty: 12 TABLET | Refills: 0 | Status: SHIPPED | OUTPATIENT
Start: 2024-09-27 | End: 2024-10-01

## 2024-09-27 RX ORDER — IOPAMIDOL 755 MG/ML
100 INJECTION, SOLUTION INTRAVASCULAR
Status: COMPLETED | OUTPATIENT
Start: 2024-09-27 | End: 2024-09-27

## 2024-09-27 RX ADMIN — DICYCLOMINE HYDROCHLORIDE 20 MG: 10 CAPSULE ORAL at 03:09

## 2024-09-27 RX ADMIN — METRONIDAZOLE 500 MG: 250 TABLET ORAL at 04:09

## 2024-09-27 RX ADMIN — SODIUM CHLORIDE 1000 ML: 9 INJECTION, SOLUTION INTRAVENOUS at 03:09

## 2024-09-27 RX ADMIN — IOPAMIDOL 100 ML: 755 INJECTION, SOLUTION INTRAVENOUS at 03:09

## 2024-09-27 RX ADMIN — CIPROFLOXACIN 500 MG: 500 TABLET ORAL at 04:09

## 2024-09-27 NOTE — ED PROVIDER NOTES
Encounter Date: 9/27/2024       History     Chief Complaint   Patient presents with    Abdominal Pain     Lower abd pain and cramping that started 2 days ago.      See MDM    The history is provided by the patient. No  was used.     Review of patient's allergies indicates:  No Known Allergies  History reviewed. No pertinent past medical history.  Past Surgical History:   Procedure Laterality Date    INTERNAL FIXATION OF HIP USING THORNTON PIN       No family history on file.  Social History     Tobacco Use    Smoking status: Never    Smokeless tobacco: Never   Substance Use Topics    Alcohol use: Yes     Comment: occasional    Drug use: Yes     Types: Marijuana     Review of Systems   Constitutional:  Negative for fever.   Gastrointestinal:  Positive for abdominal pain and nausea. Negative for constipation, diarrhea and vomiting.   Genitourinary:  Negative for dysuria.   All other systems reviewed and are negative.      Physical Exam     Initial Vitals [09/27/24 1421]   BP Pulse Resp Temp SpO2   (!) 159/92 64 19 96.8 °F (36 °C) 99 %      MAP       --         Physical Exam    Nursing note and vitals reviewed.  Constitutional: She appears well-developed and well-nourished.   Eyes: Conjunctivae are normal.   Cardiovascular:  Normal rate, regular rhythm and normal heart sounds.           Pulmonary/Chest: Breath sounds normal. No respiratory distress.   Abdominal: Abdomen is soft. Bowel sounds are normal. She exhibits no distension. There is abdominal tenderness (diffuse but worse lower and left lower).   Musculoskeletal:         General: Normal range of motion.     Neurological: She is alert and oriented to person, place, and time.   Skin: Skin is warm and dry.   Psychiatric: She has a normal mood and affect.         ED Course   Procedures  Labs Reviewed   CBC WITH DIFFERENTIAL - Abnormal       Result Value    WBC 5.96      RBC 4.65      Hgb 13.3      Hct 41.2      MCV 88.6      MCH 28.6      MCHC  32.3 (*)     RDW 12.9      Platelet 245      MPV 10.5 (*)     Neut % 61.0      Lymph % 32.2      Mono % 4.5      Eos % 1.7      Basophil % 0.3      Lymph # 1.92      Neut # 3.63      Mono # 0.27      Eos # 0.10      Baso # 0.02      IG# 0.02      IG% 0.3     COMPREHENSIVE METABOLIC PANEL - Abnormal    Sodium 141      Potassium 3.8      Chloride 105      CO2 27      Glucose 163 (*)     Blood Urea Nitrogen 11.0      Creatinine 1.27 (*)     Calcium 8.8      Protein Total 7.4      Albumin 3.6      Globulin 3.8 (*)     Albumin/Globulin Ratio 0.9 (*)     Bilirubin Total 0.4      ALP 61      ALT 20      AST 20      eGFR 54      Anion Gap 9.0      BUN/Creatinine Ratio 9     URINALYSIS, REFLEX TO URINE CULTURE - Abnormal    Color, UA Yellow      Appearance, UA Clear      Specific Gravity, UA 1.020      pH, UA 6.0      Protein, UA Negative      Glucose, UA Negative      Ketones, UA Negative      Blood, UA 3+ (*)     Bilirubin, UA Negative      Urobilinogen, UA 0.2      Nitrites, UA Negative      Leukocyte Esterase, UA Trace (*)    URINALYSIS, MICROSCOPIC - Abnormal    Bacteria, UA Few (*)     Mucous, UA Small (*)     RBC, UA 0-5      WBC, UA 3-5      Squamous Epithelial Cells, UA Moderate (*)    LIPASE - Abnormal    Lipase Level 150 (*)    PREGNANCY TEST, URINE RAPID - Normal    hCG Qualitative, Urine Negative            Imaging Results              US Abdomen Limited (Final result)  Result time 09/27/24 16:00:50      Final result by Josh Spicer MD (09/27/24 16:00:50)                   Narrative:    EXAMINATION  US ABDOMEN LIMITED    CLINICAL HISTORY  upper abdomen;    TECHNIQUE  Multiplanar grayscale sonographic evaluation of the right upper quadrant was performed, with focused Doppler assessment of the main portal vein.    COMPARISON  None available at the time of initial interpretation.    FINDINGS  Exam quality: Limited secondary to suboptimal fasting state with poor distension of the gallbladder lumen.   Additionally, there is extensive bowel gas artifact obscuring visualization.    Pancreas: The pancreatic tail is inadequately visualized.  The proximal pancreatic tissue is without evidence of expansile mass-like lesion or ductal dilatation.  No suspicious peripancreatic fluid.    Gallbladder: Within limitations discussed above, no definite echogenic, shadowing stone is identified within the gallbladder.  There is no wall thickening or pericholecystic fluid.  Sonographic Sy sign is reported negative.    Bile ducts: No intra- / extrahepatic biliary dilatation suggestive of obstructing pathology.    Liver: Size is within normal limits.  No focal contour irregularity or nodular changes of the liver surface.  No discrete mass-like abnormality.  There is diffusely increased echogenicity of the hepatic tissue, in keeping with fatty infiltration.  Normal hepatopetal flow is seen within the portal vein.    Other findings: No suspicious right kidney lesion, shadowing stone, or hydronephrosis.  No free fluid identified. The visualized systemic vascular structures are unremarkable.    IMPRESSION  1. Mildly limited exam secondary to technical factors discussed above.  2. Within limitations, no convincing acute right upper quadrant abnormality.  3. Incidental findings to suggest hepatic steatosis.      Electronically signed by: Josh Spicer  Date:    09/27/2024  Time:    16:00                                      CT Abdomen Pelvis With IV Contrast NO Oral Contrast (Final result)  Result time 09/27/24 15:57:38      Final result by Josh Spicer MD (09/27/24 15:57:38)                   Narrative:    EXAMINATION  CT ABDOMEN PELVIS WITH IV CONTRAST    CLINICAL HISTORY  Abdominal pain, acute, nonlocalized;    TECHNIQUE  Post-contrast helical-acquisition CT images were obtained and multiplanar reformats accomplished by a CT technologist at a separate workstation, pushed to PACS for physician review.    CONTRAST  *IV:  ISOVUE-370, 100 mL  *Enteric: none    COMPARISON  2 January 2021    FINDINGS  Images were reviewed in soft tissue, lung, and bone windows.    Exam quality: adequate for evaluation    Lines/tubes: none visualized    No acute abnormality of the included lower thoracic cavity.    Gallbladder and bile ducts are unremarkable.  There is no acute process or space-occupying lesion involving the upper abdominal solid organs.  Kidneys enhance in symmetric fashion.  No findings of distal obstructive uropathy.  Urinary bladder is mildly distended, with no definite focal mural lesion.  Uterus and adnexal structures are normal for CT appearance.    Stomach is minimally distended, with no focal mural abnormality.  There are no small bowel findings to suggest high-grade mechanical obstruction.  Appendix is normal in appearance.  Moderate burden of retained fecal material is present through the course of the colon.  Additionally, there is scattered descending and sigmoid diverticulosis with proximal sigmoid segment circumferential mural thickening and pericolonic fat stranding.  No extraluminal gas or organized collection is identified.    No pathologic lymph node enlargement.  Aortoiliac course is nonaneurysmal.    No acute osseous displacement or destructive skeletal lesion.  Screw fixation of the left femoral neck is similar in comparison, with stable degenerative lumbosacral and bilateral hip changes.  Body wall subcutaneous tissues and visualized muscular structures are unremarkable.    IMPRESSION  1. Descending/sigmoid diverticulosis with findings of acute proximal sigmoid diverticulitis.  2. No evidence of full-thickness sigmoid wall disruption or pericolonic drainable abscess.  3. Additional secondary details without significant change from the comparison CT.  ==========  This report was flagged in Epic as abnormal.      RADIATION DOSE  Automated tube current modulation, weight-based exposure dosing, and/or iterative  reconstruction technique utilized to reach lowest reasonably achievable exposure rate.    DLP: 899 mGy*cm      Electronically signed by: Josh Spicer  Date:    09/27/2024  Time:    15:57                                     Medications   ciprofloxacin HCl tablet 500 mg (has no administration in time range)   metroNIDAZOLE tablet 500 mg (has no administration in time range)   dicyclomine capsule 20 mg (20 mg Oral Given 9/27/24 1503)   iopamidoL (ISOVUE-370) injection 100 mL (100 mLs Intravenous Given 9/27/24 1529)   0.9%  NaCl infusion (0 mLs Intravenous Stopped 9/27/24 1636)     Medical Decision Making  45 y/o female presents with 3 days of abdominal pain worse lower than upper. Intermittent mild nausea. No vomiting. No constipation or diarrhea. No dysuria. No fever. Worse after eating at times. Has hx diverticulitis     Labs show no leukocytosis. Glucose mildly elevated 160's. Lipase 150 (slightly over 2x normal). Alk phos, ast/alt normal. US shows no GB issues. CT shows diverticulitis without abscess or perforation.     IVF received along with bentyl. Her abdominal cramps have improved. She is sleeping when I enter the room to update her. She is not vomiting. Drinking water here. Will place on antibiotics and given bentyl. She is having financial issues and lost her medicaid she states. Will give [employer] card to help with prescriptions and give first dose here.     Amount and/or Complexity of Data Reviewed  Labs: ordered. Decision-making details documented in ED Course.  Radiology: ordered. Decision-making details documented in ED Course.    Risk  Prescription drug management.      Additional MDM:   Differential Diagnosis:   Other: The following diagnoses were also considered and will be evaluated: pancreatitis, cholecystitis and diverticulitis.                                   Clinical Impression:  Final diagnoses:  [K57.32] Diverticulitis of sigmoid colon (Primary)          ED Disposition Condition    Discharge  Stable          ED Prescriptions       Medication Sig Dispense Start Date End Date Auth. Provider    ciprofloxacin HCl (CIPRO) 500 MG tablet Take 1 tablet (500 mg total) by mouth 2 (two) times daily. for 7 days 14 tablet 9/27/2024 10/4/2024 Tika Bravo FNP    metroNIDAZOLE (FLAGYL) 500 MG tablet Take 1 tablet (500 mg total) by mouth 3 (three) times daily. for 7 days 21 tablet 9/27/2024 10/4/2024 Tika Bravo FNP    dicyclomine (BENTYL) 20 mg tablet Take 1 tablet (20 mg total) by mouth 3 (three) times daily as needed (abdominal cramps). 12 tablet 9/27/2024 10/1/2024 Tika Bravo FNP          Follow-up Information       Follow up With Specialties Details Why Contact Info    Kelley Dejesus NP Family Medicine Call in 1 week As needed [address] [facility] [id number]  [phone]               Tika Bravo FNP  09/27/24 1595

## 2024-09-27 NOTE — Clinical Note
"Susie Sifuentesbrad Mcdermott was seen and treated in our emergency department on 9/27/2024.  She may return to work on 09/30/2024.       If you have any questions or concerns, please don't hesitate to call.       LPN    "

## 2024-09-27 NOTE — DISCHARGE INSTRUCTIONS
Take ciprofloxacin and flagyl as directed for 7 days to treat infection in colon that caused diverticulitis. Bentyl as needed for abdominal pain. Hydrate and try liquid diet for couple days while treating diverticulitis. Advance slowly. Also try miralax to help with constipation.

## 2025-01-25 ENCOUNTER — HOSPITAL ENCOUNTER (EMERGENCY)
Facility: HOSPITAL | Age: 45
Discharge: HOME | End: 2025-01-26
Attending: INTERNAL MEDICINE

## 2025-01-25 DIAGNOSIS — M79.671 FOOT PAIN, RIGHT: ICD-10-CM

## 2025-01-25 DIAGNOSIS — M10.9 ACUTE GOUT OF RIGHT ANKLE, UNSPECIFIED CAUSE: Primary | ICD-10-CM

## 2025-01-25 LAB
ANION GAP SERPL CALC-SCNC: 8 MEQ/L
BUN SERPL-MCNC: 10 MG/DL (ref 7–18.7)
CALCIUM SERPL-MCNC: 8.9 MG/DL (ref 8.4–10.2)
CHLORIDE SERPL-SCNC: 103 MMOL/L (ref 98–107)
CO2 SERPL-SCNC: 28 MMOL/L (ref 22–29)
CREAT SERPL-MCNC: 0.96 MG/DL (ref 0.55–1.02)
CREAT/UREA NIT SERPL: 10
GFR SERPLBLD CREATININE-BSD FMLA CKD-EPI: >60 ML/MIN/1.73/M2
GLUCOSE SERPL-MCNC: 92 MG/DL (ref 74–100)
POTASSIUM SERPL-SCNC: 3.7 MMOL/L (ref 3.5–5.1)
SODIUM SERPL-SCNC: 139 MMOL/L (ref 136–145)
URATE SERPL-MCNC: 9.3 MG/DL (ref 2.6–6)

## 2025-01-25 PROCEDURE — 84550 ASSAY OF BLOOD/URIC ACID: CPT | Performed by: INTERNAL MEDICINE

## 2025-01-25 PROCEDURE — 99284 EMERGENCY DEPT VISIT MOD MDM: CPT | Mod: 25

## 2025-01-25 PROCEDURE — 81025 URINE PREGNANCY TEST: CPT | Performed by: INTERNAL MEDICINE

## 2025-01-25 PROCEDURE — 80048 BASIC METABOLIC PNL TOTAL CA: CPT | Performed by: INTERNAL MEDICINE

## 2025-01-26 VITALS
SYSTOLIC BLOOD PRESSURE: 117 MMHG | RESPIRATION RATE: 18 BRPM | BODY MASS INDEX: 41.2 KG/M2 | OXYGEN SATURATION: 99 % | HEART RATE: 68 BPM | HEIGHT: 66 IN | WEIGHT: 256.38 LBS | DIASTOLIC BLOOD PRESSURE: 74 MMHG | TEMPERATURE: 98 F

## 2025-01-26 LAB — B-HCG UR QL: NEGATIVE

## 2025-01-26 PROCEDURE — 63600175 PHARM REV CODE 636 W HCPCS: Mod: JZ,TB | Performed by: INTERNAL MEDICINE

## 2025-01-26 PROCEDURE — 25000003 PHARM REV CODE 250: Performed by: INTERNAL MEDICINE

## 2025-01-26 PROCEDURE — 96372 THER/PROPH/DIAG INJ SC/IM: CPT | Performed by: INTERNAL MEDICINE

## 2025-01-26 RX ORDER — ALLOPURINOL 300 MG/1
300 TABLET ORAL DAILY
Qty: 30 TABLET | Refills: 0 | Status: SHIPPED | OUTPATIENT
Start: 2025-01-26 | End: 2025-02-25

## 2025-01-26 RX ORDER — COLCHICINE 0.6 MG/1
1.2 TABLET, FILM COATED ORAL
Status: COMPLETED | OUTPATIENT
Start: 2025-01-26 | End: 2025-01-26

## 2025-01-26 RX ORDER — INDOMETHACIN 50 MG/1
50 CAPSULE ORAL 3 TIMES DAILY PRN
Qty: 30 CAPSULE | Refills: 0 | Status: SHIPPED | OUTPATIENT
Start: 2025-01-26 | End: 2025-02-05

## 2025-01-26 RX ORDER — KETOROLAC TROMETHAMINE 30 MG/ML
60 INJECTION, SOLUTION INTRAMUSCULAR; INTRAVENOUS
Status: COMPLETED | OUTPATIENT
Start: 2025-01-26 | End: 2025-01-26

## 2025-01-26 RX ADMIN — COLCHICINE 1.2 MG: 0.6 CAPSULE ORAL at 12:01

## 2025-01-26 RX ADMIN — KETOROLAC TROMETHAMINE 60 MG: 30 INJECTION, SOLUTION INTRAMUSCULAR at 12:01

## 2025-01-26 NOTE — ED PROVIDER NOTES
"Encounter Date: 1/25/2025       History     Chief Complaint   Patient presents with    Foot Pain     Pt c/o right foot pain x 2 days and states she is "having a gout attack.". Hx gout.      44-year-old black female with pain in her right ankle x2 days states that she is having a gout attack.  She has a history of gout but states she does not see a regular doctor to get put on gout medicine and for the past 2 days her ankle pain is getting much worse to the point where she can not even wear a sock or shoe      Review of patient's allergies indicates:  No Known Allergies  Past Medical History:   Diagnosis Date    Gout, unspecified      Past Surgical History:   Procedure Laterality Date    INTERNAL FIXATION OF HIP USING THORNTON PIN       No family history on file.  Social History     Tobacco Use    Smoking status: Never    Smokeless tobacco: Never   Substance Use Topics    Alcohol use: Yes     Comment: occasional    Drug use: Yes     Types: Marijuana     Review of Systems   Constitutional: Negative.  Negative for activity change, appetite change, chills, diaphoresis, fatigue, fever and unexpected weight change.   HENT: Negative.  Negative for congestion, dental problem, drooling, ear discharge, ear pain, facial swelling, hearing loss, mouth sores, nosebleeds, postnasal drip, rhinorrhea, sinus pressure, sinus pain, sneezing, sore throat, tinnitus, trouble swallowing and voice change.    Eyes: Negative.  Negative for photophobia, pain, discharge, redness, itching and visual disturbance.   Respiratory: Negative.  Negative for apnea, cough, choking, chest tightness, shortness of breath, wheezing and stridor.    Cardiovascular: Negative.  Negative for chest pain, palpitations and leg swelling.   Gastrointestinal: Negative.  Negative for abdominal distention, abdominal pain, anal bleeding, blood in stool, constipation, diarrhea, nausea, rectal pain and vomiting.   Endocrine: Negative.  Negative for cold intolerance, heat " intolerance, polydipsia, polyphagia and polyuria.   Genitourinary: Negative.  Negative for decreased urine volume, difficulty urinating, dyspareunia, dysuria, enuresis, flank pain, frequency, genital sores, hematuria, menstrual problem, pelvic pain, urgency, vaginal bleeding, vaginal discharge and vaginal pain.   Musculoskeletal:  Positive for gait problem and joint swelling. Negative for arthralgias, back pain, myalgias, neck pain and neck stiffness.   Skin: Negative.  Negative for color change, pallor, rash and wound.   Allergic/Immunologic: Negative.  Negative for environmental allergies, food allergies and immunocompromised state.   Neurological:  Negative for dizziness, tremors, seizures, syncope, facial asymmetry, speech difficulty, weakness, light-headedness, numbness and headaches.   Hematological: Negative.  Negative for adenopathy. Does not bruise/bleed easily.   Psychiatric/Behavioral: Negative.  Negative for agitation, behavioral problems, confusion, decreased concentration, dysphoric mood, hallucinations, self-injury, sleep disturbance and suicidal ideas. The patient is not nervous/anxious and is not hyperactive.    All other systems reviewed and are negative.      Physical Exam     Initial Vitals [01/25/25 2026]   BP Pulse Resp Temp SpO2   116/73 69 18 98.3 °F (36.8 °C) 98 %      MAP       --         Physical Exam    Nursing note and vitals reviewed.  Constitutional: She appears well-developed and well-nourished. She is not diaphoretic. No distress.   Morbid obesity   HENT:   Head: Normocephalic and atraumatic. Mouth/Throat: Oropharynx is clear and moist. No oropharyngeal exudate.   Eyes: Conjunctivae and EOM are normal. Pupils are equal, round, and reactive to light. No scleral icterus.   Neck: Neck supple. No JVD present.   Normal range of motion.  Cardiovascular:  Normal rate, regular rhythm, normal heart sounds and intact distal pulses.     Exam reveals no gallop and no friction rub.       No  murmur heard.  Pulmonary/Chest: Breath sounds normal. No respiratory distress. She has no wheezes. She exhibits no tenderness.   Abdominal: Abdomen is soft. Bowel sounds are normal. She exhibits no distension. There is no abdominal tenderness. There is no rebound.   Musculoskeletal:      Cervical back: Normal range of motion and neck supple.      Right ankle: Tenderness present. Decreased range of motion.        Feet:       Comments: Decreased motion secondary to pain     Neurological: She is alert and oriented to person, place, and time. She has normal strength.   Skin: Skin is warm and dry. Capillary refill takes less than 2 seconds.   Psychiatric: She has a normal mood and affect. Her behavior is normal. Judgment and thought content normal.         ED Course   Procedures  Labs Reviewed   URIC ACID - Abnormal       Result Value    Uric Acid 9.3 (*)    PREGNANCY TEST, URINE RAPID - Normal    hCG Qualitative, Urine Negative     BASIC METABOLIC PANEL    Sodium 139      Potassium 3.7      Chloride 103      CO2 28      Glucose 92      Blood Urea Nitrogen 10.0      Creatinine 0.96      BUN/Creatinine Ratio 10      Calcium 8.9      Anion Gap 8.0      eGFR >60            Imaging Results              X-Ray Foot Complete Right (Preliminary result)  Result time 01/26/25 00:19:35      Wet Read by Nasir Mchugh MD (01/26/25 00:19:35, [facility] - Emergency Dept, Emergency Medicine)    No acute fractures or dislocations noted                                     Medications   colchicine capsule/tablet 1.2 mg (1.2 mg Oral Given 1/26/25 0018)   ketorolac injection 60 mg (60 mg Intramuscular Given 1/26/25 0018)     Medical Decision Making  44-year-old black female with a history of gout presents with right ankle and foot pain.  Differential diagnosis includes but is not limited to fracture, dislocation, contusion, gout, pseudogout.  Blood work was drawn and her uric acid is extremely high x-ray was taken shows  "no fractures or dislocations in her renal functions normal so will treat with colchicine and an injection of Toradol and I will send allopurinol and Indocin into the pharmacy explained to her she has to find a primary and get put on regular allopurinol maintenance meds    Problems Addressed:  Acute gout of right ankle, unspecified cause: acute illness or injury  Foot pain, right: acute illness or injury    Amount and/or Complexity of Data Reviewed  Independent Historian: spouse  Labs: ordered. Decision-making details documented in ED Course.  Radiology: ordered and independent interpretation performed. Decision-making details documented in ED Course.    Risk  Prescription drug management.                                      Clinical Impression:  Final diagnoses:  [M79.671] Foot pain, right  [M10.9] Acute gout of right ankle, unspecified cause (Primary)          ED Disposition Condition    Discharge Stable          ED Prescriptions       Medication Sig Dispense Start Date End Date Auth. Provider    allopurinoL (ZYLOPRIM) 300 MG tablet Take 1 tablet (300 mg total) by mouth once daily. 30 tablet 1/26/2025 2/25/2025 Nasir Mchugh MD    indomethacin (INDOCIN) 50 MG capsule Take 1 capsule (50 mg total) by mouth 3 (three) times daily as needed (Gout pain). 30 capsule 1/26/2025 2/5/2025 Nasir Mchugh MD          Follow-up Information       Follow up With Specialties Details Why Contact Info    Primary care physician  In 1 week            Portions of this note have been created with voice recognition software. Occasional "wrong-words" or "sound alike" substitutions may have occurred due to inherent limitations of voice software. Please read the note carefully and recognize, using context, word substitutions may have occurred.       Nasir Mchugh MD  01/26/25 0019    "

## 2025-02-05 ENCOUNTER — HOSPITAL ENCOUNTER (EMERGENCY)
Facility: HOSPITAL | Age: 45
Discharge: HOME | End: 2025-02-05
Attending: EMERGENCY MEDICINE

## 2025-02-05 VITALS
BODY MASS INDEX: 41.22 KG/M2 | HEART RATE: 70 BPM | RESPIRATION RATE: 16 BRPM | DIASTOLIC BLOOD PRESSURE: 84 MMHG | TEMPERATURE: 98 F | OXYGEN SATURATION: 98 % | WEIGHT: 255.38 LBS | SYSTOLIC BLOOD PRESSURE: 140 MMHG

## 2025-02-05 DIAGNOSIS — N39.0 ACUTE UTI (URINARY TRACT INFECTION): Primary | ICD-10-CM

## 2025-02-05 LAB
BACTERIA #/AREA URNS AUTO: ABNORMAL /HPF
BILIRUB UR QL STRIP.AUTO: NEGATIVE
CLARITY UR: ABNORMAL
COLOR UR AUTO: YELLOW
GLUCOSE UR QL STRIP: NEGATIVE
HGB UR QL STRIP: ABNORMAL
KETONES UR QL STRIP: NEGATIVE
LEUKOCYTE ESTERASE UR QL STRIP: ABNORMAL
NITRITE UR QL STRIP: POSITIVE
PH UR STRIP: 6.5 [PH]
PROT UR QL STRIP: ABNORMAL
RBC #/AREA URNS AUTO: ABNORMAL /HPF
SP GR UR STRIP.AUTO: 1.02 (ref 1–1.03)
SQUAMOUS #/AREA URNS AUTO: ABNORMAL /HPF
UROBILINOGEN UR STRIP-ACNC: 0.2
WBC #/AREA URNS AUTO: ABNORMAL /HPF

## 2025-02-05 PROCEDURE — 99283 EMERGENCY DEPT VISIT LOW MDM: CPT

## 2025-02-05 PROCEDURE — 25000003 PHARM REV CODE 250: Performed by: EMERGENCY MEDICINE

## 2025-02-05 PROCEDURE — 81003 URINALYSIS AUTO W/O SCOPE: CPT | Performed by: EMERGENCY MEDICINE

## 2025-02-05 PROCEDURE — 87077 CULTURE AEROBIC IDENTIFY: CPT | Performed by: EMERGENCY MEDICINE

## 2025-02-05 RX ORDER — PHENAZOPYRIDINE HYDROCHLORIDE 100 MG/1
100 TABLET, FILM COATED ORAL 3 TIMES DAILY PRN
Qty: 9 TABLET | Refills: 0 | Status: SHIPPED | OUTPATIENT
Start: 2025-02-05 | End: 2025-02-15

## 2025-02-05 RX ORDER — PHENAZOPYRIDINE HYDROCHLORIDE 100 MG/1
200 TABLET, FILM COATED ORAL
Status: COMPLETED | OUTPATIENT
Start: 2025-02-05 | End: 2025-02-05

## 2025-02-05 RX ORDER — NITROFURANTOIN 25; 75 MG/1; MG/1
100 CAPSULE ORAL
Status: COMPLETED | OUTPATIENT
Start: 2025-02-05 | End: 2025-02-05

## 2025-02-05 RX ORDER — NITROFURANTOIN 25; 75 MG/1; MG/1
100 CAPSULE ORAL 2 TIMES DAILY
Qty: 10 CAPSULE | Refills: 0 | Status: SHIPPED | OUTPATIENT
Start: 2025-02-05 | End: 2025-02-10

## 2025-02-05 RX ADMIN — PHENAZOPYRIDINE HYDROCHLORIDE 200 MG: 100 TABLET ORAL at 02:02

## 2025-02-05 RX ADMIN — NITROFURANTOIN 100 MG: 25; 75 CAPSULE ORAL at 02:02

## 2025-02-05 NOTE — Clinical Note
"Susie Sifuentesbrad Mcdermott was seen and treated in our emergency department on 2/5/2025.  She may return to work on 02/06/2025.       If you have any questions or concerns, please don't hesitate to call.      Carmen Ruiz RN    "

## 2025-02-05 NOTE — ED PROVIDER NOTES
Encounter Date: 2/5/2025       History     Chief Complaint   Patient presents with    Urinary Frequency     Began yesterday with urinary frequency, today with pelvic pain.      HPI  Review of patient's allergies indicates:  No Known Allergies  Past Medical History:   Diagnosis Date    Gout, unspecified      Past Surgical History:   Procedure Laterality Date    INTERNAL FIXATION OF HIP USING THORNTON PIN       No family history on file.  Social History     Tobacco Use    Smoking status: Never    Smokeless tobacco: Never   Substance Use Topics    Alcohol use: Yes     Comment: occasional    Drug use: Yes     Types: Marijuana     Review of Systems    Physical Exam     Initial Vitals [02/05/25 0140]   BP Pulse Resp Temp SpO2   (!) 145/87 72 16 98 °F (36.7 °C) 97 %      MAP       --         Physical Exam    ED Course   Procedures  Labs Reviewed   URINALYSIS, REFLEX TO URINE CULTURE - Abnormal       Result Value    Color, UA Yellow      Appearance, UA Slightly Cloudy (*)     Specific Gravity, UA 1.025      pH, UA 6.5      Protein, UA 2+ (*)     Glucose, UA Negative      Ketones, UA Negative      Blood, UA 2+ (*)     Bilirubin, UA Negative      Urobilinogen, UA 0.2      Nitrites, UA Positive (*)     Leukocyte Esterase, UA 1+ (*)    URINALYSIS, MICROSCOPIC - Abnormal    Bacteria, UA Moderate (*)     RBC, UA 21-50 (*)     WBC, UA 21-50 (*)     Squamous Epithelial Cells, UA Few (*)    CULTURE, URINE          Imaging Results    None          Admission on 02/05/2025, Discharged on 02/05/2025   Component Date Value Ref Range Status    Color, UA 02/05/2025 Yellow  Yellow, Light-Yellow, Dark Yellow, Loren, Straw Final    Appearance, UA 02/05/2025 Slightly Cloudy (A)  Clear Final    Specific Gravity, UA 02/05/2025 1.025  1.005 - 1.030 Final    pH, UA 02/05/2025 6.5  5.0 - 8.5 Final    Protein, UA 02/05/2025 2+ (A)  Negative Final    Glucose, UA 02/05/2025 Negative  Negative, Normal Final    Ketones, UA 02/05/2025 Negative  Negative  Final    Blood, UA 02/05/2025 2+ (A)  Negative Final    Bilirubin, UA 02/05/2025 Negative  Negative Final    Urobilinogen, UA 02/05/2025 0.2  0.2, 1.0, Normal Final    Nitrites, UA 02/05/2025 Positive (A)  Negative Final    Leukocyte Esterase, UA 02/05/2025 1+ (A)  Negative Final    Bacteria, UA 02/05/2025 Moderate (A)  None Seen, Rare, Occasional /HPF Final    RBC, UA 02/05/2025 21-50 (A)  None Seen, 0-2, 3-5, 0-5 /HPF Final    WBC, UA 02/05/2025 21-50 (A)  None Seen, 0-2, 3-5, 0-5 /HPF Final    Squamous Epithelial Cells, UA 02/05/2025 Few (A)  None Seen, Rare, Occasional, Occ /HPF Final       Medications   nitrofurantoin (macrocrystal-monohydrate) 100 MG capsule 100 mg (100 mg Oral Given 2/5/25 0203)   phenazopyridine tablet 200 mg (200 mg Oral Given 2/5/25 0203)     Medical Decision Making  0150: Patient resting comfortably.  No evidence for pyelonephritis.      The patient is resting comfortably and feels better, is alert and in no distress. The abdominal examination is benign; in particular, there is no discomfort at McBurney's point and there is no pulsatile mass. The history, exam, diagnostic testing, and current condition do not suggest acute appendicitis, incarcerated hernia, bowel obstruction, acute cholecystitis, bowel perforation, major gastrointestinal bleeding, severe diverticulitis, abdominal aortic aneurysm, mesenteric ischemia, volvulus, sepsis, or other significant pathology to warrant further testing, continued ED treatment, admission, or surgical evaluation at this point. The vital signs have been stable. The patient does not have uncontrollable pain, intractable vomiting, or other significant ongoing symptoms. The patient's condition is stable and appropriate for discharge from the emergency department. The patient will pursue further outpatient evaluation with the primary care physician or other designated or consulting physician as indicated in the discharge instructions.  The patient is to  return to the ED for a recheck if they experience an increase or change in their pain, persistent vomiting, bleeding, fever or inability to take oral fluids, or any changing or new symptoms. The patient and/or caregiver expressed understanding that follow-up is essential, agreed with this plan and rationale, their questions were answered, and they felt comfortable going home.    Decision to admit/transfer/discharge:  After my evaluation of the patient and interpretation of all relevant information, the decision has been made to discharge the patient.    I independently reviewed and interpreted any laboratory tests, radiographic images, EKGs, rhythm strips, and other diagnostic tests that were obtained during this Emergency Department visit.    Drug/medication management:  Parenteral controlled substances and other dangerous medications, if administered, can be found in the order section of this chart.  I reviewed the patient's home medications and made changes/adjustments as medically indicated.  Any new medications are documented under the prescription section of this chart.    Social determinants of health addressed during this ED visit:  The patient/caregiver knowledge deficit about today's condition and treatment plan was addressed by me through appropriate patient/caregiver education.    Additional verbal discharge instructions were given and discussed with the patient.  I have spoken with the patient and/or caregivers. I have explained the patient's condition, diagnoses and treatment plan based on the information available to me at this time. I have answered the patient's and/or caregiver's questions and addressed any concerns. The patient and/or caregivers have as good an understanding of the patient's diagnosis, condition and treatment plan as can be expected at this point. The vital signs have been stable. The patient's condition is stable and appropriate for discharge from the emergency department.     The  patient will pursue further outpatient evaluation with the primary care physician or other designated or consulting physician as outlined in the discharge instructions. The patient and/or caregivers are agreeable to this plan of care and follow-up instructions have been explained in detail. The patient and/or caregivers have expressed an understanding of the discharge instructions. The patient and/or caregivers are aware that any significant change in condition or worsening of symptoms should prompt an immediate return to this or the closest emergency department or a call to 911.    Risk  Prescription drug management.                                      Clinical Impression:  Final diagnoses:  [N39.0] Acute UTI (urinary tract infection) (Primary)          ED Disposition Condition    Discharge Stable          ED Prescriptions       Medication Sig Dispense Start Date End Date Auth. Provider    nitrofurantoin, macrocrystal-monohydrate, (MACROBID) 100 MG capsule Take 1 capsule (100 mg total) by mouth 2 (two) times daily. for 5 days 10 capsule 2/5/2025 2/10/2025 Casey Denton MD    phenazopyridine (PYRIDIUM) 100 MG tablet Take 1 tablet (100 mg total) by mouth 3 (three) times daily as needed for Pain. 9 tablet 2/5/2025 2/15/2025 Casey Denton MD          Follow-up Information       Follow up With Specialties Details Why Contact Info    Kelley Dejesus, NP Family Medicine  Follow up within 5 days for recheck. [address] [facility] [id number]  [phone]               Casey Denton MD  02/05/25 0438

## 2025-02-07 LAB
BACTERIA UR CULT: ABNORMAL
BACTERIA UR CULT: ABNORMAL